# Patient Record
Sex: FEMALE | Race: WHITE | URBAN - METROPOLITAN AREA
[De-identification: names, ages, dates, MRNs, and addresses within clinical notes are randomized per-mention and may not be internally consistent; named-entity substitution may affect disease eponyms.]

---

## 2017-10-23 ENCOUNTER — EMERGENCY (EMERGENCY)
Facility: HOSPITAL | Age: 82
LOS: 1 days | Discharge: HOME | End: 2017-10-23
Attending: EMERGENCY MEDICINE | Admitting: EMERGENCY MEDICINE
Payer: MEDICARE

## 2017-10-23 VITALS
SYSTOLIC BLOOD PRESSURE: 166 MMHG | DIASTOLIC BLOOD PRESSURE: 83 MMHG | RESPIRATION RATE: 16 BRPM | HEART RATE: 92 BPM | OXYGEN SATURATION: 97 % | TEMPERATURE: 98 F

## 2017-10-23 DIAGNOSIS — H61.21 IMPACTED CERUMEN, RIGHT EAR: ICD-10-CM

## 2017-10-23 DIAGNOSIS — H92.09 OTALGIA, UNSPECIFIED EAR: ICD-10-CM

## 2017-10-23 PROCEDURE — 99283 EMERGENCY DEPT VISIT LOW MDM: CPT

## 2017-10-23 PROCEDURE — 69209 REMOVE IMPACTED EAR WAX UNI: CPT

## 2017-10-23 PROCEDURE — 99283 EMERGENCY DEPT VISIT LOW MDM: CPT | Mod: 25

## 2017-10-23 NOTE — ED PROVIDER NOTE - MEDICAL DECISION MAKING DETAILS
pt with right cerumen impaction - soaked with hydrogen peroxide -  and irrigated with nasal saline.  pt reports relief of symptoms, after disimpaction, ear canal noted to be swollen and inflamed - ? otitis externa written prescription given to patient for cipro drops - pt to follow up with ent

## 2017-10-23 NOTE — ED ADULT NURSE NOTE - OBJECTIVE STATEMENT
[place] [facility], per brother, pt c.o L ear pain x few days. admits to excess wax. denies fever/chills. awaiting md courtney.

## 2017-10-23 NOTE — ED PROVIDER NOTE - OBJECTIVE STATEMENT
84 y/o f c/o ear congestion and wax impaction with decreased hearing and pain.  Pt denies fever or chills.  Pt accompanied by brother to ED.

## 2017-10-23 NOTE — ED ADULT TRIAGE NOTE - CHIEF COMPLAINT QUOTE
Pt brought to ED by brother. Pt c/o L ear pain, brother states " she has a build up of wax there". Pt is hard of hearing.

## 2017-10-24 RX ORDER — CIPROFLOXACIN AND DEXAMETHASONE 3; 1 MG/ML; MG/ML
4 SUSPENSION/ DROPS AURICULAR (OTIC)
Qty: 8 | Refills: 0 | OUTPATIENT
Start: 2017-10-24 | End: 2017-10-31

## 2017-10-24 NOTE — ED POST DISCHARGE NOTE - REASON FOR FOLLOW-UP
Other Pt pharmacy called after hours line to report that cipro otic is not covered by pt insurace, so ciprodex was prescribed in place. - ESEQUIEL AmorC

## 2024-03-25 NOTE — ED ADULT NURSE NOTE - NS TRANSFER PATIENT BELONGINGS
In an effort to ensure that our patients [employer], a Team Member has reviewed your chart and identified an opportunity to provide the best care possible. An attempt was made to discuss or schedule overdue Preventive or Disease Management screening.     The Outcome was Contact was not made, letter/portal message sent.  If you have any questions or need help with scheduling, contact our Health Outreach Team at [phone]. Care Gaps include Wellness Visits.    Clothing